# Patient Record
Sex: MALE | NOT HISPANIC OR LATINO | Employment: UNEMPLOYED | URBAN - NONMETROPOLITAN AREA
[De-identification: names, ages, dates, MRNs, and addresses within clinical notes are randomized per-mention and may not be internally consistent; named-entity substitution may affect disease eponyms.]

---

## 2018-03-12 ENCOUNTER — OFFICE VISIT CONVERTED (OUTPATIENT)
Dept: FAMILY MEDICINE CLINIC | Age: 23
End: 2018-03-12
Attending: NURSE PRACTITIONER

## 2018-06-22 ENCOUNTER — OFFICE VISIT CONVERTED (OUTPATIENT)
Dept: FAMILY MEDICINE CLINIC | Age: 23
End: 2018-06-22
Attending: NURSE PRACTITIONER

## 2018-07-05 ENCOUNTER — OFFICE VISIT CONVERTED (OUTPATIENT)
Dept: FAMILY MEDICINE CLINIC | Age: 23
End: 2018-07-05
Attending: NURSE PRACTITIONER

## 2018-07-20 ENCOUNTER — OFFICE VISIT CONVERTED (OUTPATIENT)
Dept: FAMILY MEDICINE CLINIC | Age: 23
End: 2018-07-20
Attending: NURSE PRACTITIONER

## 2019-02-28 ENCOUNTER — OFFICE VISIT CONVERTED (OUTPATIENT)
Dept: FAMILY MEDICINE CLINIC | Age: 24
End: 2019-02-28
Attending: NURSE PRACTITIONER

## 2019-06-18 ENCOUNTER — OFFICE VISIT CONVERTED (OUTPATIENT)
Dept: FAMILY MEDICINE CLINIC | Age: 24
End: 2019-06-18
Attending: NURSE PRACTITIONER

## 2019-09-25 ENCOUNTER — OFFICE VISIT CONVERTED (OUTPATIENT)
Dept: FAMILY MEDICINE CLINIC | Age: 24
End: 2019-09-25
Attending: NURSE PRACTITIONER

## 2020-03-25 ENCOUNTER — OFFICE VISIT CONVERTED (OUTPATIENT)
Dept: FAMILY MEDICINE CLINIC | Age: 25
End: 2020-03-25
Attending: NURSE PRACTITIONER

## 2021-05-18 NOTE — PROGRESS NOTES
Jaxson BobBarbie 1995     Office/Outpatient Visit    Visit Date: Thu, Feb 28, 2019 10:14 am    Provider: Enedelia Yadav N.P. (Assistant: Germaine Caro MA)    Location: [facility]        Electronically signed by Enedelia Yadav N.P. on  02/28/2019 08:01:11 PM                             SUBJECTIVE:        CC:     Mr. Bob is a 23 year old White male.  This is a follow-up visit.  med refills         HPI:         Patient presents with depression.  This is a routine follow-up.  The diagnosis of depression was made 15 months ago.  Current medications include Zoloft.  Currently, he is doing well without any significant affective symptoms.  Presently, Mr. Bob denies suicidal ideation.  He has no known pertinent medical conditions.      ROS:     CONSTITUTIONAL:  Negative for chills, fatigue and fever.      CARDIOVASCULAR:  Negative for chest pain, orthopnea, paroxysmal nocturnal dyspnea and pedal edema.      RESPIRATORY:  Negative for dyspnea and cough.      GASTROINTESTINAL:  Negative for abdominal pain, heartburn, constipation, diarrhea, and stool changes.      PSYCHIATRIC:  Positive for anxiety and depression ( (improved with meds) ).   Negative for suicidal thoughts.          PM/FM/SH:     Last Reviewed on 2/28/2019 10:25 AM by Enedelia Yadav    Past Medical History:         fraternal twin         Surgical History:         Cholecystectomy: oct 2017;     Tonsillectomy/Adenoidectomy         Family History:     Father: Seizure Disorder;  Bipolar Disorder     Mother: Systemic Lupus Erythematosus     Maternal Grandfather:;  Type 2 Diabetes     Maternal Grandmother: Breast Cancer         Social History:         Household:  Lives with his mother and sibling(s) ( 1 brother ).  Occupation:      Marital Status: Single     Children: None         Tobacco/Alcohol/Supplements:     Last Reviewed on 2/28/2019 10:25 AM by Enedelia Yadav    Tobacco: He has never smoked.           Alcohol: Frequency:    rarely;         Mental Health History:     Last Reviewed on 2/28/2019 10:25 AM by Enedelia Yadav            Current Problems:     Last Reviewed on 2/28/2019 10:25 AM by Enedelia Yadav    Tachycardia, NOS     Depression         Immunizations:     DTaP 4/5/2000     DTaP-Hib 4/11/1995     DTaP-Hib 6/6/1995     DTaP-Hib 8/1/1995     DTaP-Hib 4/30/1996     Td adult 7/1/2005     Hep B (pedi/adol, 3-dose schedule) 1994     Hep B (pedi/adol, 3-dose schedule) 4/11/1995     Hep B (pedi/adol, 3-dose schedule) 1/23/1996     OPV  Poliovirus, live (oral) 4/11/1995     OPV  Poliovirus, live (oral) 6/6/1995     OPV  Poliovirus, live (oral) 8/1/1995     IPV  Poliovirus, inactivated 4/5/2000     MMR  (Measles-Mumps-Rubella), live 4/30/1996     MMR  (Measles-Mumps-Rubella), live 4/5/2000     PPD 12/7/2017         Allergies:     Last Reviewed on 2/28/2019 10:25 AM by Enedelia Yadav      No Known Drug Allergies.     Lexapro: fatigue (Adverse Reaction)        Current Medications:     Last Reviewed on 2/28/2019 10:25 AM by Enedelia Yadav    Zoloft 50mg Tablet one a day         OBJECTIVE:        Vitals:         Current: 2/28/2019 10:18:31 AM    Ht:  5 ft, 9 in;  Wt: 265.8 lbs;  BMI: 39.3    T: 98.1 F (oral);  BP: 139/89 mm Hg (left arm, sitting);  P: 94 bpm (left arm (BP Cuff), sitting);  sCr: 0.93 mg/dL;  GFR: 137.35        Exams:     PHYSICAL EXAM:     GENERAL: Vitals recorded well developed, well nourished;  well groomed;  no apparent distress;     EYES: lids and lacrimal system are normal in appearance; extraocular movements intact; conjunctiva and cornea are normal; PERRLA;     E/N/T:  normal EACs, TMs, nasal/oral mucosa, teeth, gingiva, and oropharynx;     NECK:  supple, full ROM; no thyromegaly; no carotid bruits;     RESPIRATORY: normal respiratory rate and pattern with no distress; normal breath sounds with no rales, rhonchi, wheezes or rubs;     CARDIOVASCULAR: normal rate; rhythm is  regular;  normal S1; normal S2; no systolic murmur; no cyanosis; no edema;     GASTROINTESTINAL: nontender, nondistended; no hepatosplenomegaly or masses; no bruits;     SKIN:  no significant rashes or lesions; no suspicious moles;     MUSCULOSKELETAL:  Normal range of motion, strength and tone;     NEUROLOGICAL:  cranial nerves, motor and sensory function, reflexes, gait and coordination are all intact;     PSYCHIATRIC:  appropriate affect and demeanor; normal speech pattern; grossly normal memory;         ASSESSMENT:           311   F32.8   F34.1  Depression              DDx:         ORDERS:         Meds Prescribed:       Refill of: Zoloft (Sertraline HCl) 50mg Tablet one a day  #90 (Ninety) tablet(s) Refills: 3                 PLAN:          Depression         FOLLOW-UP: Schedule follow-up appointments on a p.r.n. basis..            Prescriptions:       Refill of: Zoloft (Sertraline HCl) 50mg Tablet one a day  #90 (Ninety) tablet(s) Refills: 3             Patient Recommendations:        For  Depression:     Schedule follow-up appointments as needed.                APPOINTMENT INFORMATION:        Monday Tuesday Wednesday Thursday Friday Saturday Sunday            Time:___________________AM  PM   Date:_____________________             CHARGE CAPTURE:           Primary Diagnosis:     311 Depression            F32.8    Other depressive episodesPlease remove code. F32.8, will not let me remove from chart. dmt            F34.1    Dysthymic disorder              Orders:          14593   Office/outpatient visit; established patient, level 3  (In-House)

## 2021-05-18 NOTE — PROGRESS NOTES
Jaxson Bob 1995     Office/Outpatient Visit    Visit Date: Tue, Jun 18, 2019 12:50 pm    Provider: Enedelia Yadav N.P. (Assistant: Tyesha Contreras LPN)    Location: [facility]        Electronically signed by Enedelia Yadav N.P. on  06/18/2019 01:16:32 PM                             SUBJECTIVE:        CC:     Mr. Bob is a 23 year old White male.  Sore in throat x 7 days         HPI:     Sore in the roof of his mouth x 7 days , hurts when he swallows or puts anything on it. Denies actual sore throat or fever     ROS:     CONSTITUTIONAL:  Negative for chills, fatigue and fever.      E/N/T:  Positive for oral sores/ulcers.      CARDIOVASCULAR:  Negative for chest pain, orthopnea, paroxysmal nocturnal dyspnea and pedal edema.      RESPIRATORY:  Negative for dyspnea and cough.      GASTROINTESTINAL:  Negative for abdominal pain, heartburn, constipation, diarrhea, and stool changes.      PSYCHIATRIC:  Negative for anxiety and depression.          PMH/FM/:     Last Reviewed on 6/18/2019 01:06 PM by Enedelia Yadav    Past Medical History:         fraternal twin         Surgical History:         Cholecystectomy: oct 2017;     Tonsillectomy/Adenoidectomy         Family History:     Father: Seizure Disorder;  Bipolar Disorder     Mother: Systemic Lupus Erythematosus     Maternal Grandfather:;  Type 2 Diabetes     Maternal Grandmother: Breast Cancer         Social History:         Household:  Lives with his mother and sibling(s) ( 1 brother ).  Occupation:      Marital Status: Single     Children: None         Tobacco/Alcohol/Supplements:     Last Reviewed on 6/18/2019 01:06 PM by Enedelia Yadav    Tobacco: He has never smoked.          Alcohol: Frequency:    rarely;             Current Problems:     Last Reviewed on 6/18/2019 01:06 PM by Enedelia Yadav    Tachycardia, NOS     Depression     Sore throat         Immunizations:     DTaP 4/5/2000     DTaP-Hib 4/11/1995      DTaP-Hib 6/6/1995     DTaP-Hib 8/1/1995     DTaP-Hib 4/30/1996     Td adult 7/1/2005     Hep B (pedi/adol, 3-dose schedule) 1994     Hep B (pedi/adol, 3-dose schedule) 4/11/1995     Hep B (pedi/adol, 3-dose schedule) 1/23/1996     OPV  Poliovirus, live (oral) 4/11/1995     OPV  Poliovirus, live (oral) 6/6/1995     OPV  Poliovirus, live (oral) 8/1/1995     IPV  Poliovirus, inactivated 4/5/2000     MMR  (Measles-Mumps-Rubella), live 4/30/1996     MMR  (Measles-Mumps-Rubella), live 4/5/2000     PPD 12/7/2017         Allergies:     Last Reviewed on 6/18/2019 01:06 PM by Enedelia Yadav      No Known Drug Allergies.     Lexapro: fatigue (Adverse Reaction)        Current Medications:     Last Reviewed on 6/18/2019 01:06 PM by Enedelia Yadav    Zoloft 50mg Tablet one a day         OBJECTIVE:        Vitals:         Current: 6/18/2019 12:54:28 PM    Ht:  5 ft, 9 in;  Wt: 262 lbs;  BMI: 38.7    T: 98.4 F (oral);  BP: 127/83 mm Hg (left arm, sitting);  P: 112 bpm (left arm (BP Cuff), sitting);  sCr: 0.93 mg/dL;  GFR: 136.51        Exams:     PHYSICAL EXAM:     GENERAL: Vitals recorded well developed, well nourished;  well groomed;  no apparent distress;     E/N/T: OROPHARYNX: palate exam reveals ulcer;     RESPIRATORY: normal respiratory rate and pattern with no distress; normal breath sounds with no rales, rhonchi, wheezes or rubs;     CARDIOVASCULAR: normal rate; rhythm is regular;  normal S1; normal S2; no systolic murmur; no cyanosis; no edema;     GASTROINTESTINAL: nontender, nondistended; no hepatosplenomegaly or masses; no bruits;     NEUROLOGIC: GROSSLY INTACT     PSYCHIATRIC:  appropriate affect and demeanor; normal speech pattern; grossly normal memory;         ASSESSMENT:           074.0   K12.39  Aphthous ulcer              DDx:         ORDERS:         Meds Prescribed:       Peridex (Chlorhexidine Gluconate) 0.12% Oral Rinse apply to ulcer QID prn  #240 ([address] [place] [address] [employer]) ml Refills: 0        Magic Mouthwash (diphenhydramine/nystatin/dexameth 1:1:1) (Diphenhydramine/Nystatin/Dexamethasone pharmacy compound) 12.5mg/5ml/100,000U/1ml/0.5mg/5ml Oral Liquid 1/4 tsp po swish and spit qid  #240 (Two [place] [address]) ml Refills: 0                 PLAN:          Aphthous ulcer         RECOMMENDATIONS given include: Warm water gargles.      FOLLOW-UP: Schedule follow-up appointments on a p.r.n. basis..            Prescriptions:       Peridex (Chlorhexidine Gluconate) 0.12% Oral Rinse apply to ulcer QID prn  #240 ([address] [place] [address]) ml Refills: 0       Magic Mouthwash (diphenhydramine/nystatin/dexameth 1:1:1) (Diphenhydramine/Nystatin/Dexamethasone pharmacy compound) 12.5mg/5ml/100,000U/1ml/0.5mg/5ml Oral Liquid 1/4 tsp po swish and spit qid  #240 (Two [place] [address]) ml Refills: 0             Patient Recommendations:        For  Aphthous ulcer:     Schedule follow-up appointments as needed.                APPOINTMENT INFORMATION:        Monday Tuesday Wednesday Thursday Friday Saturday Sunday            Time:___________________AM  PM   Date:_____________________             CHARGE CAPTURE:           Primary Diagnosis:     074.0 Aphthous ulcer            K12.39    Other oral mucositis (ulcerative)              Orders:          68182   Office/outpatient visit; established patient, level 3  (In-House)

## 2021-05-18 NOTE — PROGRESS NOTES
Jaxson BobBarbie 1995     Office/Outpatient Visit    Visit Date: Thu, Jul 5, 2018 11:01 am    Provider: Enedelia Yadav N.P. (Assistant: Nayely Contreras MA)    Location: [facility]        Electronically signed by Enedelia Yadav N.P. on  07/05/2018 12:14:59 PM                             SUBJECTIVE:        CC:     Mr. Bob is a 22 year old White male.  Left Leg Ache         HPI:         Mr. Bob presents with leg pain.  Mr. Bob sustained an injury to the left thigh.  No precipitating event or injury is identified.  Pain started 1 to 2 weeks ago.  He denies associated,  numbness, swelling, tingling, warmth and weakness.  Pain radiates to: NO radiation.  He characterizes the pain as cramping.  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 says he was suppose to grow out of it but never did.      ROS:     CONSTITUTIONAL:  Negative for chills, fatigue, fever and weight change.      CARDIOVASCULAR:  Negative for chest pain, orthopnea, paroxysmal nocturnal dyspnea and pedal edema.      RESPIRATORY:  Negative for dyspnea and cough.      GASTROINTESTINAL:  Negative for abdominal pain, heartburn, constipation, diarrhea, and stool changes.      MUSCULOSKELETAL:  Positive for left hip /thigh area cramping type pain x 2 weeks, no injury that he can remember. Hurts worse at night sometimes when he is laying down. Denies any problems with urination, getting up at night to void  or buring with urination.          PM/FM/:     Last Reviewed on 7/05/2018 11:10 AM by Enedelia Yadav    Past Medical History:         fraternal twin         Surgical History:         Cholecystectomy: oct 2017;     Tonsillectomy/Adenoidectomy         Family History:     Father: Seizure Disorder;  Bipolar Disorder     Mother: Systemic Lupus Erythematosus     Maternal Grandfather:;  Type 2 Diabetes     Maternal Grandmother: Breast Cancer         Social History:          Household:  Lives with his mother and sibling(s) ( 1 brother ).      Currently attends College. 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          Alcohol: Frequency:    rarely;             Current Problems:     Last Reviewed on 7/05/2018 11:10 AM by Enedelia Yadav    Fever of unknown origin (FUO)     Tachycardia, NOS     Depression     Leg pain     Blood pressure elevation without a diagnosis of HTN     Otalgia, otogenic origin         Immunizations:     DTaP 4/5/2000     DTaP-Hib 4/11/1995     DTaP-Hib 6/6/1995     DTaP-Hib 8/1/1995     DTaP-Hib 4/30/1996     Td adult 7/1/2005     Hep B (pedi/adol, 3-dose schedule) 1994     Hep B (pedi/adol, 3-dose schedule) 4/11/1995     Hep B (pedi/adol, 3-dose schedule) 1/23/1996     OPV  Poliovirus, live (oral) 4/11/1995     OPV  Poliovirus, live (oral) 6/6/1995     OPV  Poliovirus, live (oral) 8/1/1995     IPV  Poliovirus, inactivated 4/5/2000     MMR  (Measles-Mumps-Rubella), live 4/30/1996     MMR  (Measles-Mumps-Rubella), live 4/5/2000     PPD 12/7/2017         Allergies:     Last Reviewed on 7/05/2018 11:10 AM by Enedelia Yadav      No Known Drug Allergies.     Lexapro: fatigue (Adverse Reaction)        Current Medications:     Last Reviewed on 7/05/2018 11:10 AM by Enedelia Yadav    Zoloft 50mg Tablet 1/2 a day for first week then one a day         OBJECTIVE:        Vitals:         Current: 7/5/2018 11:03:06 AM    Ht:  5 ft, 9 in;  Wt: 249 lbs;  BMI: 36.8    T: 98.5 F (oral);  BP: 147/82 mm Hg (left arm, sitting);  P: 83 bpm (left arm (BP Cuff), sitting);  sCr: 0.93 mg/dL;  GFR: 134.72        Exams:     PHYSICAL EXAM:     GENERAL: Vitals recorded well developed, well nourished;  well groomed;  no apparent distress;     RESPIRATORY: normal  respiratory rate and pattern with no distress; normal breath sounds with no rales, rhonchi, wheezes or rubs;     CARDIOVASCULAR: normal rate; rhythm is regular;  normal S1; normal S2; no systolic murmur; no cyanosis; no edema;     GASTROINTESTINAL: nontender, nondistended; no hepatosplenomegaly or masses; no bruits;     MUSCULOSKELETAL: gait: affected by a left leg limp;  Mild positive paramjit left hip, right hip negative;     PSYCHIATRIC:  appropriate affect and demeanor; normal speech pattern; grossly normal memory;         ASSESSMENT:           729.5   M79.606  Leg pain              DDx:     719.45   M25.559  Hip pain              DDx:         ORDERS:         Meds Prescribed:       Refill of: Zoloft (Sertraline HCl) 50mg Tablet one a day  #30 (Thirty) tablet(s) Refills: 2       Amitriptyline HCl 25mg Tablet 1 po QHS  #30 (Thirty) tablet(s) Refills: 0         Radiology/Test Orders:       [id number]  Bilateral hip x-ray, minimum of two views of each hip, including anteroposterior view of pelvis  (Send-Out; Stat)           Lab Orders:       APPTO  Appointment need  (In-House)                   PLAN:          Leg pain         FOLLOW-UP: Schedule a follow-up visit in 1 month..            Prescriptions:       Amitriptyline HCl 25mg Tablet 1 po QHS  #30 (Thirty) tablet(s) Refills: 0           Orders:       APPTO  Appointment need  (In-House)            Hip pain If xrays are normal and pain is not improving with Amitriptyline then will need to consider additional imaging. dmt         RADIOLOGY:  I have ordered a bilateral hip x-ray to be done today.            Orders:       [id number]  Bilateral hip x-ray, minimum of two views of each hip, including anteroposterior view of pelvis  (Send-Out; Stat)               Other Prescriptions:       Refill of: Zoloft (Sertraline HCl) 50mg Tablet one a day  #30 (Thirty) tablet(s) Refills: 2         Patient Recommendations:        For  Leg pain:     Schedule a follow-up visit in 1 month.                 APPOINTMENT INFORMATION:        Monday Tuesday Wednesday Thursday Friday Saturday Sunday            Time:___________________AM  PM   Date:_____________________             CHARGE CAPTURE:           Primary Diagnosis:     729.5 Leg pain            M79.606    Pain in leg, unspecified              Orders:          [id number]   Office/outpatient visit; established patient, level 4  (In-House)             APPTO   Appointment need  (In-House)           719.45 Hip pain            M25.559    Pain in unspecified hip        ADDENDUMS:      ____________________________________    Date: 07/12/2018 11:20 AM    Author: Valerie Rubio         Visit Note Faxed to:        Kaz Barnes  (Surgery, Orthopedic); Number [phone]

## 2021-05-18 NOTE — PROGRESS NOTES
Jaxson BobBarbie 1995     Office/Outpatient Visit    Visit Date: Fri, Jul 20, 2018 02:42 pm    Provider: Enedelia Yadav N.P. (Assistant: Nayely Contreras MA)    Location: [facility]        Electronically signed by Enedelia Yadav N.P. on  07/20/2018 04:33:50 PM                             SUBJECTIVE:        CC:     Mr. Bbo is a 22 year old White male.  Leg Pain         HPI:     Mr. Bob presents with leg pain.  Mr. Bob sustained no injury to the left thigh.  No precipitating event or injury is identified.  Pain started 6 weeks ago.  He denies associated,  numbness, swelling, tingling, warmth and weakness.  Pain does not radiate.  He characterizes the pain as cramping.  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 says he was suppose to grow out of it but never did.  Took Elavil over past 4 weeks without improvement. Has apt with Dr. Barnes but it is one month away         ROS:     CONSTITUTIONAL:  Negative for chills, fatigue, fever and weight change.      CARDIOVASCULAR:  Negative for chest pain, orthopnea, paroxysmal nocturnal dyspnea and pedal edema.      RESPIRATORY:  Negative for dyspnea and cough.      GASTROINTESTINAL:  Negative for abdominal pain, heartburn, constipation, diarrhea, and stool changes.      MUSCULOSKELETAL:  Positive for Left leg pain.          PMH/FMH/SH:     Last Reviewed on 7/20/2018 04:30 PM by Enedelia Yadav    Past Medical History:         fraternal twin         Surgical History:         Cholecystectomy: oct 2017;     Tonsillectomy/Adenoidectomy         Family History:     Father: Seizure Disorder;  Bipolar Disorder     Mother: Systemic Lupus Erythematosus     Maternal Grandfather:;  Type 2 Diabetes     Maternal Grandmother: Breast Cancer         Social History:         Household:  Lives with his mother and sibling(s) ( 1 brother ).      Currently attends College. ( [employer];  [employer]; field of  study is music education ) Occupation: Student     Marital Status: Single     Children: None         Tobacco/Alcohol/Supplements:     Last Reviewed on 7/20/2018 04:30 PM by Enedelia Yadav    Tobacco: He has never smoked.          Alcohol: Frequency:    rarely;             Current Problems:     Last Reviewed on 7/20/2018 04:30 PM by Enedelia Yadav    Hip pain     Fever of unknown origin (FUO)     Tachycardia, NOS     Depression     Leg pain     Blood pressure elevation without a diagnosis of HTN     Otalgia, otogenic origin         Immunizations:     DTaP 4/5/2000     DTaP-Hib 4/11/1995     DTaP-Hib 6/6/1995     DTaP-Hib 8/1/1995     DTaP-Hib 4/30/1996     Td adult 7/1/2005     Hep B (pedi/adol, 3-dose schedule) 1994     Hep B (pedi/adol, 3-dose schedule) 4/11/1995     Hep B (pedi/adol, 3-dose schedule) 1/23/1996     OPV  Poliovirus, live (oral) 4/11/1995     OPV  Poliovirus, live (oral) 6/6/1995     OPV  Poliovirus, live (oral) 8/1/1995     IPV  Poliovirus, inactivated 4/5/2000     MMR  (Measles-Mumps-Rubella), live 4/30/1996     MMR  (Measles-Mumps-Rubella), live 4/5/2000     PPD 12/7/2017         Allergies:     Last Reviewed on 7/20/2018 04:30 PM by Enedelia Yadav      No Known Drug Allergies.     Lexapro: fatigue (Adverse Reaction)        Current Medications:     Last Reviewed on 7/20/2018 04:30 PM by Enedelia Yadav    Zoloft 50mg Tablet one a day         OBJECTIVE:        Vitals:         Current: 7/20/2018 2:46:29 PM    Ht:  5 ft, 9 in;  Wt: 247.9 lbs;  BMI: 36.6    T: 99.5 F (oral);  BP: 126/86 mm Hg (left arm, sitting);  P: 90 bpm (left arm (BP Cuff), sitting);  sCr: 0.93 mg/dL;  GFR: 134.47        Exams:     PHYSICAL EXAM:     GENERAL: Vitals recorded well developed, well nourished;  well groomed;  no apparent distress;     RESPIRATORY: normal respiratory rate and pattern with no distress; normal breath sounds with no rales, rhonchi, wheezes or rubs;     CARDIOVASCULAR: normal rate;  rhythm is regular;  normal S1; normal S2; no systolic murmur; no cyanosis; no edema;     GASTROINTESTINAL: nontender; normal bowel sounds; no organomegaly; no abdominal hernias;     MUSCULOSKELETAL: gait: affected by a left leg limp;  Mild positive paramjit left hip, right hip negative; DTR radha wnl;         ASSESSMENT:           729.5   M79.606  Leg pain              DDx:         ORDERS:         Meds Prescribed:       Meloxicam 15mg Tablet 1 po QD with food  #30 (Thirty) tablet(s) Refills: 1       Gabapentin 300mg Capsules 1 capsule HS  #30 (Thirty) capsule(s) Refills: 0                 PLAN:          Leg pain We will check up with Dr. Barnes and see if we can get appt moved up dmt         FOLLOW-UP: Schedule follow-up appointments on a p.r.n. basis..   for f/u in 2-3 weeks           Prescriptions:       Meloxicam 15mg Tablet 1 po QD with food  #30 (Thirty) tablet(s) Refills: 1       Gabapentin 300mg Capsules 1 capsule HS  #30 (Thirty) capsule(s) Refills: 0             Patient Recommendations:        For  Leg pain:     Schedule follow-up appointments as needed.                APPOINTMENT INFORMATION:        Monday Tuesday Wednesday Thursday Friday Saturday Sunday            Time:___________________AM  PM   Date:_____________________             CHARGE CAPTURE:           Primary Diagnosis:     729.5 Leg pain            M79.606    Pain in leg, unspecified              Orders:          [id number]   Office/outpatient visit; established patient, level 3  (In-House)               ADDENDUMS:      ____________________________________    Date: 08/06/2018 02:02 PM    Author: Danuta Shea         Visit Note Faxed to:        User Entered Recipient; Number [phone]

## 2021-05-18 NOTE — PROGRESS NOTES
Jaxson Bob 1995     Office/Outpatient Visit    Visit Date: Fri, Jun 22, 2018 01:06 pm    Provider: Rola Lezama N.P. (Assistant: Rona Pollard MA)    Location: [facility]        Electronically signed by Rola Lezama N.P. on  06/22/2018 08:38:56 PM                             SUBJECTIVE:        CC:     Mr. Bob is a 22 year old White male.  Patient complains of ear ache and headaches.          HPI: seen with maritza orr student     Ear fullness started 2 or 3 days ago. Now has headache and fever with sinus pressure. Taken nasal spray today but nothing else.         noted for first time at office visit today.  denies body aches, sore throat, tick bites, rashes, abd pain, vomiting , diarrhea or any other signs of illness.      ROS:     CONSTITUTIONAL:  Negative for chills and fever.      EYES:  Negative for blurred vision.      E/N/T:  Negative for nasal congestion, frequent rhinorrhea and sore throat.      CARDIOVASCULAR:  Negative for chest pain and palpitations.  acid reflux and takes omeperazole prn     RESPIRATORY:  Negative for recent cough, dyspnea and frequent wheezing.      GASTROINTESTINAL:  Negative for abdominal pain, diarrhea, nausea and vomiting.      GENITOURINARY:  Negative for dysuria.      MUSCULOSKELETAL:  Negative for arthralgias and myalgias.      INTEGUMENTARY:  Negative for rash.      NEUROLOGICAL:  Positive for headaches.   Negative for dizziness, paresthesias or weakness.      PSYCHIATRIC:  Positive for anxiety and depression.  Taking zoloft and doing well.         PMH/FM/SH:     Last Reviewed on 3/12/2018 07:00 PM by Enedelia Yadav    Past Medical History:         fraternal twin         Surgical History:         Cholecystectomy: oct 2017;     Tonsillectomy/Adenoidectomy         Family History:     Father: Seizure Disorder;  Bipolar Disorder     Mother: Systemic Lupus Erythematosus     Maternal Grandfather:;  Type 2 Diabetes     Maternal  Grandmother: Breast Cancer         Social History:         Household:  Lives with his mother and sibling(s) ( 1 brother ).      Currently attends College. 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          Alcohol: Frequency:    rarely;         Substance Abuse History:     Last Reviewed on 3/12/2018 07:00 PM by Enedelia Yadav    None         Mental Health History:     Last Reviewed on 3/12/2018 07:00 PM by Enedelia Yadav        Communicable Diseases (eg STDs):     Last Reviewed on 3/12/2018 07:00 PM by Enedelia Yadav            Current Problems:     Last Reviewed on 3/12/2018 07:00 PM by Enedelia Yadav    Tachycardia, NOS     Depression         Immunizations:     DTaP 4/5/2000     DTaP-Hib 4/11/1995     DTaP-Hib 6/6/1995     DTaP-Hib 8/1/1995     DTaP-Hib 4/30/1996     Td adult 7/1/2005     Hep B (pedi/adol, 3-dose schedule) 1994     Hep B (pedi/adol, 3-dose schedule) 4/11/1995     Hep B (pedi/adol, 3-dose schedule) 1/23/1996     OPV  Poliovirus, live (oral) 4/11/1995     OPV  Poliovirus, live (oral) 6/6/1995     OPV  Poliovirus, live (oral) 8/1/1995     IPV  Poliovirus, inactivated 4/5/2000     MMR  (Measles-Mumps-Rubella), live 4/30/1996     MMR  (Measles-Mumps-Rubella), live 4/5/2000     PPD 12/7/2017         Allergies:     Last Reviewed on 6/22/2018 01:11 PM by Rona Pollard      No Known Drug Allergies.     Lexapro: fatigue (Adverse Reaction)        Current Medications:     Last Reviewed on 6/22/2018 01:11 PM by Rona Pollard    Zoloft 50mg Tablet 1/2 a day for first week then one a day         OBJECTIVE:        Vitals:         Historical:     03/12/2018  BP:   139/79 mm Hg ( (left arm, , sitting, );)     12/13/2017  BP:   137/78 mm Hg ( (left arm, , sitting, );)      03/12/2018  Wt:   244.4lbs        Current: 6/22/2018 1:10:00 PM    Ht:  5 ft, 9 in;  Wt: 250 lbs;  BMI: 36.9    T: 101.4 F (oral);  BP: 152/81 mm Hg (left arm, sitting);  P: 127 bpm (left arm (BP Cuff), sitting);  sCr: 0.93 mg/dL;  GFR: 134.95        Repeat:     1:13:38 PM     BP:   150/70mm Hg (left arm, sitting)     1:12:22 PM     P:   131bpm (finger clip, sitting)         Exams:     PHYSICAL EXAM:     GENERAL: Vitals recorded well developed, well nourished;  well groomed;  no apparent distress;     EYES: conjunctiva and cornea are normal;     E/N/T: EARS:  normal external auditory canals and tympanic membranes;  grossly normal hearing; NOSE:  normal nasal mucosa, septum, turbinates, and sinuses; OROPHARYNX:  normal mucosa, dentition, gingiva, and posterior pharynx;     NECK: thyroid is non-palpable;     RESPIRATORY: normal respiratory rate and pattern with no distress; normal breath sounds with no rales, rhonchi, wheezes or rubs;     CARDIOVASCULAR: normal rate; rhythm is regular;  no edema;     LYMPHATIC: no enlargement of cervical or facial nodes; no supraclavicular nodes;     SKIN:  No rash;     MUSCULOSKELETAL:  Normal range of motion, strength and tone;     NEUROLOGIC: mental status: oriented to person, place, and time;  GROSSLY INTACT     PSYCHIATRIC: appropriate affect and demeanor; normal speech pattern;         ASSESSMENT           388.71   H92.03  Otalgia, otogenic origin              DDx:     780.60   R50.9  Fever of unknown origin (FUO)              DDx:     796.2   R03.0  Blood pressure elevation without a diagnosis of HTN              DDx:         ORDERS:         Lab Orders:       [id number]  [facility] CBC with 3 part diff  (Send-Out)         63225  [facility] Urinalysis, automated, with micro  (Send-Out)                   PLAN:          Otalgia, otogenic origin         reassure.  normal exam.  continue flonase.           Fever of unknown origin (FUO)     LABORATORY:  Labs ordered to be performed  today include CBC and urinalysis with micro.      RECOMMENDATIONS given include: rest , increase fluids, ibuprofen or aleve prn with food.  labs pending..            Orders:       [id number]  [facility] - [facility] CBC with 3 part diff  (Send-Out)         19596  [facility] - [facility] Urinalysis, automated, with micro  (Send-Out)            Blood pressure elevation without a diagnosis of HTN         RECOMMENDATIONS given include: free bp check in one week..              CHARGE CAPTURE           **Please note: ICD descriptions below are intended for billing purposes only and may not represent clinical diagnoses**        Primary Diagnosis:         388.71 Otalgia, otogenic origin            H92.03    Otalgia, bilateral              Orders:          [id number]   Office/outpatient visit; established patient, level 3  (In-House)           780.60 Fever of unknown origin (FUO)            R50.9    Fever, unspecified    796.2 Blood pressure elevation without a diagnosis of HTN            R03.0    Elevated blood-pressure reading, without diagnosis of hypertension

## 2021-05-18 NOTE — PROGRESS NOTES
Jaxson BobBarbie 1995     Office/Outpatient Visit    Visit Date: Mon, Mar 12, 2018 06:30 pm    Provider: Enedelia Yadav N.P. (Assistant: Germaine Caro MA)    Location: [facility]        Electronically signed by Enedelia Yadav N.P. on  03/12/2018 07:53:13 PM                             SUBJECTIVE:        CC:     Mr. Bob is a 22 year old White male.  He presents with right ear ache.          HPI:         Patient complains of ear pain.      Mr. Bob complains of right ear pain.  Associated symptoms include diminished hearing and dizziness.  He denies cough, ear drainage or fever.  The symptoms began 2 weeks ago.  Pertinent history includes allergies.  He has already tried to relieve the symptoms with antihistamines and decongestants.      ROS:     CONSTITUTIONAL:  Negative for chills, fatigue, fever and weight change.      E/N/T:  Positive for ear pain ( bilateral ).      CARDIOVASCULAR:  Negative for chest pain, orthopnea, paroxysmal nocturnal dyspnea and pedal edema.      RESPIRATORY:  Negative for dyspnea and cough.      GASTROINTESTINAL:  Negative for abdominal pain, heartburn, constipation, diarrhea, and stool changes.          [facility]/:     Last Reviewed on 3/12/2018 07:00 PM by Enedelia Yadav    Past Medical History:         fraternal twin         Surgical History:         Cholecystectomy: oct 2017;     Tonsillectomy/Adenoidectomy         Family History:     Father: Seizure Disorder;  Bipolar Disorder     Mother: Systemic Lupus Erythematosus     Maternal Grandfather:;  Type 2 Diabetes     Maternal Grandmother: Breast Cancer         Social History:         Household:  Lives with his mother and sibling(s) ( 1 brother ).      Currently attends College. 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          Alcohol: Frequency:    rarely;             Current Problems:     Last Reviewed on 3/12/2018 07:00 PM by Enedelia Yadav    Tachycardia, NOS     Depression     Ear pain         Immunizations:     DTaP 4/5/2000     DTaP-Hib 4/11/1995     DTaP-Hib 6/6/1995     DTaP-Hib 8/1/1995     DTaP-Hib 4/30/1996     Td adult 7/1/2005     Hep B (pedi/adol, 3-dose schedule) 1994     Hep B (pedi/adol, 3-dose schedule) 4/11/1995     Hep B (pedi/adol, 3-dose schedule) 1/23/1996     OPV  Poliovirus, live (oral) 4/11/1995     OPV  Poliovirus, live (oral) 6/6/1995     OPV  Poliovirus, live (oral) 8/1/1995     IPV  Poliovirus, inactivated 4/5/2000     MMR  (Measles-Mumps-Rubella), live 4/30/1996     MMR  (Measles-Mumps-Rubella), live 4/5/2000     PPD 12/7/2017         Allergies:     Last Reviewed on 3/12/2018 07:00 PM by Enedelia Yadav      No Known Drug Allergies.     Lexapro: fatigue (Adverse Reaction)        Current Medications:     Last Reviewed on 3/12/2018 07:00 PM by Enedelia Yadav    Zoloft 50mg Tablet 1/2 a day for first week then one a day     Sudafed 30mg Tablet 1 tab qd prn         OBJECTIVE:        Vitals:         Current: 3/12/2018 6:32:01 PM    Ht:  5 ft, 9 in;  Wt: 244.4 lbs;  BMI: 36.1    T: 97.5 F (oral);  BP: 139/79 mm Hg (left arm, sitting);  P: 96 bpm (left arm (BP Cuff), sitting);  sCr: 0.93 mg/dL;  GFR: 133.66        Exams:     PHYSICAL EXAM:     GENERAL: Vitals recorded well developed, well nourished;  well groomed;  no apparent distress;     E/N/T: EARS: both TMs are dull and red;     RESPIRATORY: normal respiratory rate and pattern with no distress; normal breath sounds with no rales, rhonchi, wheezes or rubs;     CARDIOVASCULAR: normal rate; rhythm is regular;  normal S1; normal S2; no systolic murmur; no cyanosis; no edema;     GASTROINTESTINAL: nontender, nondistended; no hepatosplenomegaly or masses; no bruits;         ASSESSMENT:           382.00    H66.003  Acute otitis media              DDx:         ORDERS:         Meds Prescribed:       Amoxicillin 875mg Tablet One PO BID X 10 days.  #20 (Twenty) tablet(s) Refills: 0                 PLAN:          Acute otitis media         FOLLOW-UP: Advised to call if there is no improvement Follow-up by phone if no improvement in 1 week..   Schedule follow-up appointments on a p.r.n. basis..            Prescriptions:       Amoxicillin 875mg Tablet One PO BID X 10 days.  #20 (Twenty) tablet(s) Refills: 0           Patient Education Handouts:       Acute Ear Infection (Otitis Media)              Patient Recommendations:        For  Acute otitis media:     Follow-up by phone if no improvement in 1 week. Schedule follow-up appointments as needed.                APPOINTMENT INFORMATION:        Monday Tuesday Wednesday Thursday Friday Saturday Sunday            Time:___________________AM  PM   Date:_____________________             CHARGE CAPTURE:           Primary Diagnosis:     382.00 Acute otitis media            H66.003    Acute suppurative otitis media without spontaneous rupture of ear drum, bilateral              Orders:          [id number]   Office/outpatient visit; established patient, level 3  (In-House)

## 2021-05-18 NOTE — PROGRESS NOTES
Jaxson Bob  1995     Office/Outpatient Visit    Visit Date: Wed, Mar 25, 2020 03:39 pm    Provider: Enedelia Yadav N.P. (Assistant: Michelle Mehta MA)    Location: [facility]        Electronically signed by Enedelia Yadav N.P. on  03/25/2020 04:09:58 PM                             Subjective:        CC: Mr. Bob is a 24 year old White male.  This is a follow-up visit.  check up, medication refills         HPI:       Telehealth visit for follow up on anxiety and depression. Taking medication daily with good results , mood stable no exacerbations or break throughs recently. Having some Reflux symptoms over last few weeks has taken OTC Omeprazole from his Mom and that did help. .    ROS:     CONSTITUTIONAL:  Negative for chills, fatigue, fever, and weight change.      CARDIOVASCULAR:  Negative for chest pain, palpitations, tachycardia, orthopnea, and edema.      RESPIRATORY:  Negative for recent cough, dyspnea and frequent wheezing.      GASTROINTESTINAL:  Positive for acid reflux symptoms ( acid taste in mouth; burning in throat and upper chest; indigestion and belching; took Moms Omeprazole with improvement ).   Negative for abdominal pain, constipation, diarrhea, nausea or vomiting.      PSYCHIATRIC:  Positive for anxiety and depression ( (improved with zoloft) ).          Past Medical History / Family History / Social History:         Last Reviewed on 3/25/2020 04:05 PM by Enedelia Yadav    Past Medical History:         fraternal twin         Surgical History:         Cholecystectomy: oct 2017;     Tonsillectomy/Adenoidectomy         Family History:     Father: Seizure Disorder;  Bipolar Disorder     Mother: Systemic Lupus Erythematosus     Maternal Grandfather:;  Type 2 Diabetes     Maternal Grandmother: Breast Cancer         Social History:         Household:  Lives with his mother and sibling(s) ( 1 brother ).  Occupation:      Marital Status: Single      Children: None         Tobacco/Alcohol/Supplements:     Last Reviewed on 3/25/2020 04:05 PM by Enedelia Ydaav    Tobacco: He has never smoked.          Alcohol: Frequency:    rarely;         Substance Abuse History:     Last Reviewed on 3/25/2020 04:05 PM by Enedelia Yadav    None         Mental Health History:     Last Reviewed on 3/25/2020 04:05 PM by Enedelia Yadav        Communicable Diseases (eg STDs):     Last Reviewed on 3/25/2020 04:05 PM by Enedelia Yadav        Current Problems:     Last Reviewed on 3/25/2020 04:05 PM by Enedelia Yadav    Other depressive episodes    Dysthymic disorder    Tachycardia, unspecified        Immunizations:     DTaP 4/5/2000    DTaP-Hib 4/11/1995    DTaP-Hib 6/6/1995    DTaP-Hib 8/1/1995    DTaP-Hib 4/30/1996    Td adult 7/1/2005    Hep B (pedi/adol, 3-dose schedule) 1994    Hep B (pedi/adol, 3-dose schedule) 4/11/1995    Hep B (pedi/adol, 3-dose schedule) 1/23/1996    OPV  Poliovirus, live (oral) 4/11/1995    OPV  Poliovirus, live (oral) 6/6/1995    OPV  Poliovirus, live (oral) 8/1/1995    IPV  Poliovirus, inactivated 4/5/2000    MMR  (Measles-Mumps-Rubella), live 4/30/1996    MMR  (Measles-Mumps-Rubella), live 4/5/2000    PPD 12/7/2017        Allergies:     Last Reviewed on 3/25/2020 04:05 PM by Enedelia Yadav    Lexapro: fatigue  (Adverse Reaction)        Current Medications:     Last Reviewed on 3/25/2020 04:05 PM by Enedelia Yadav    Zoloft 50mg Tablet [one a day]        Objective:        Exams: Answering questions appropriately         Assessment:         F34.1   Dysthymic disorder       K21.9   Gastro-esophageal reflux disease without esophagitis           ORDERS:         Meds Prescribed:       [Refilled] Zoloft 50 mg oral tablet [one a day], #90 (ninety) tablets, Refills: 3 (three)       [New Rx] omeprazole 40 mg oral capsule,delayed release (enteric coated) [1 capsule daily], #90 (ninety) capsules, Refills: 0 (zero)                  Plan:         Dysthymic disorder    Telehealth: Verbal consent obtained for visit to occur via phone call; Staff, other than provider, present during telephone visit include Enedelia LENNON , Jaxson Bob patient; Total time spent was 10 minutes; 29700--[id number] E/M 5-10 minutes           Prescriptions:       [Refilled] Zoloft 50 mg oral tablet [one a day], #90 (ninety) tablets, Refills: 3 (three)         Gastro-esophageal reflux disease without esophagitis          Prescriptions:       [New Rx] omeprazole 40 mg oral capsule,delayed release (enteric coated) [1 capsule daily], #90 (ninety) capsules, Refills: 0 (zero)             Charge Capture:         Primary Diagnosis:     F34.1  Dysthymic disorder           Orders:      [id number]  Phys/QHP telephone evaluation 5-10 min  (In-House)              K21.9  Gastro-esophageal reflux disease without esophagitis

## 2021-05-18 NOTE — PROGRESS NOTES
"Jaxson Bob 1995     Office/Outpatient Visit    Visit Date: Wed, Sep 25, 2019 11:37 am    Provider: Rola Lezama N.P. (Assistant: Rosario Montero MA)    Location: [facility]        Electronically signed by Rola Lezama N.P. on  09/25/2019 09:33:15 PM                             SUBJECTIVE:        CC:     Mr. Bob is a 23 year old White male.  presents today due to complaints of cough, congestion, drainage X 4-5 days         HPI:         Patient complains of uRI.  These have been present for the past 6 days.  The symptoms include cough, nasal congestion and purulent nasal discharge.  He denies fever or wheezing.  He denies exposure to ill contacts.  He has already tried to relieve the symptoms with mixed cold/sinus preparations.      ROS:     CONSTITUTIONAL:  Positive for fatigue.   Negative for fever.      E/N/T:  Positive for nasal congestion and frequent rhinorrhea.   Negative for sore throat.      CARDIOVASCULAR:  Negative for chest pain, palpitations, tachycardia, orthopnea, and edema.      RESPIRATORY:  Positive for recent cough.      MUSCULOSKELETAL:  Negative for arthralgias, back pain, and myalgias.      NEUROLOGICAL:  Positive for headaches ( \"sinus\" ).   Negative for dizziness.          PMH/FMH/SH:     Last Reviewed on 6/18/2019 01:06 PM by Enedelia Yadav    Past Medical History:         fraternal twin         Surgical History:         Cholecystectomy: oct 2017;     Tonsillectomy/Adenoidectomy         Family History:     Father: Seizure Disorder;  Bipolar Disorder     Mother: Systemic Lupus Erythematosus     Maternal Grandfather:;  Type 2 Diabetes     Maternal Grandmother: Breast Cancer         Social History:         Household:  Lives with his mother and sibling(s) ( 1 brother ).  Occupation:      Marital Status: Single     Children: None         Tobacco/Alcohol/Supplements:     Last Reviewed on 6/18/2019 01:06 PM by Enedelia Yadav    Tobacco: He has never " smoked.          Alcohol: Frequency:    rarely;         Substance Abuse History:     Last Reviewed on 6/18/2019 01:06 PM by Enedelia Yadav    None         Mental Health History:     Last Reviewed on 6/18/2019 01:06 PM by Enedelia Yadav        Communicable Diseases (eg STDs):     Last Reviewed on 6/18/2019 01:06 PM by Enedelia Yadav            Current Problems:     Last Reviewed on 6/18/2019 01:06 PM by Enedelia Yadav    Tachycardia, NOS     Depression         Immunizations:     DTaP 4/5/2000     DTaP-Hib 4/11/1995     DTaP-Hib 6/6/1995     DTaP-Hib 8/1/1995     DTaP-Hib 4/30/1996     Td adult 7/1/2005     Hep B (pedi/adol, 3-dose schedule) 1994     Hep B (pedi/adol, 3-dose schedule) 4/11/1995     Hep B (pedi/adol, 3-dose schedule) 1/23/1996     OPV  Poliovirus, live (oral) 4/11/1995     OPV  Poliovirus, live (oral) 6/6/1995     OPV  Poliovirus, live (oral) 8/1/1995     IPV  Poliovirus, inactivated 4/5/2000     MMR  (Measles-Mumps-Rubella), live 4/30/1996     MMR  (Measles-Mumps-Rubella), live 4/5/2000     PPD 12/7/2017         Allergies:     Last Reviewed on 6/18/2019 01:06 PM by Enedelia Yadav      No Known Drug Allergies.     Lexapro: fatigue (Adverse Reaction)        Current Medications:     Last Reviewed on 9/25/2019 11:39 AM by Rosario Montero    Zoloft 50mg Tablet one a day         OBJECTIVE:        Vitals:         Historical:     06/18/2019  BP:   127/83 mm Hg ( (left arm, , sitting, );)     06/18/2019  Wt:   262lbs        Current: 9/25/2019 11:40:29 AM    Ht:  5 ft, 9 in;  Wt: 256.6 lbs;  BMI: 37.9    T: 98.4 F (oral);  BP: 142/88 mm Hg (left arm, sitting);  P: 91 bpm (left arm (BP Cuff), sitting);  sCr: 0.93 mg/dL;  GFR: 135.31        Repeat:     12:12:12 PM     BP:   128/78mm Hg (left arm, sitting)         Exams:     PHYSICAL EXAM:     GENERAL:  well developed and nourished; appropriately groomed; in no apparent distress;     E/N/T: EARS: both TMs are have fluid behind them;   NOSE: nasal mucosa is erythematous;  bilateral maxillary sinus tenderness present; OROPHARYNX: posterior pharynx, including tonsils, tongue, and uvula are normal;     RESPIRATORY: normal respiratory rate and pattern with no distress; normal breath sounds with no rales, rhonchi, wheezes or rubs;     CARDIOVASCULAR: normal rate; rhythm is regular;     LYMPHATIC: no enlargement of cervical or facial nodes;     MUSCULOSKELETAL:  Normal range of motion, strength and tone;     NEUROLOGIC: mental status: alert and oriented x 3; GROSSLY INTACT     PSYCHIATRIC:  appropriate affect and demeanor; normal speech pattern; grossly normal memory;         ASSESSMENT           461.0   J01.00  Acute maxillary sinusitis              DDx:         ORDERS:         Meds Prescribed:       Amoxicillin 875mg Tablet 1 BID  #20 (Twenty) tablet(s) Refills: 0                 PLAN:          Acute maxillary sinusitis         RECOMMENDATIONS given include: rest, increase oral fluid intake, and follow up if not improving..            Prescriptions:       Amoxicillin 875mg Tablet 1 BID  #20 (Twenty) tablet(s) Refills: 0             Patient Recommendations:        For  Acute maxillary sinusitis:     Get plenty of rest. Increase oral fluid intake.              CHARGE CAPTURE           **Please note: ICD descriptions below are intended for billing purposes only and may not represent clinical diagnoses**        Primary Diagnosis:         461.0 Acute maxillary sinusitis            J01.00    Acute maxillary sinusitis, unspecified              Orders:          [id number]   Office/outpatient visit; established patient, level 3  (In-House)

## 2021-07-01 VITALS
BODY MASS INDEX: 37.03 KG/M2 | DIASTOLIC BLOOD PRESSURE: 70 MMHG | TEMPERATURE: 101.4 F | HEIGHT: 69 IN | SYSTOLIC BLOOD PRESSURE: 150 MMHG | WEIGHT: 250 LBS | HEART RATE: 131 BPM

## 2021-07-01 VITALS
BODY MASS INDEX: 38 KG/M2 | WEIGHT: 256.6 LBS | HEIGHT: 69 IN | DIASTOLIC BLOOD PRESSURE: 78 MMHG | TEMPERATURE: 98.4 F | SYSTOLIC BLOOD PRESSURE: 128 MMHG | HEART RATE: 91 BPM

## 2021-07-01 VITALS
DIASTOLIC BLOOD PRESSURE: 79 MMHG | WEIGHT: 244.4 LBS | HEIGHT: 69 IN | TEMPERATURE: 97.5 F | SYSTOLIC BLOOD PRESSURE: 139 MMHG | HEART RATE: 96 BPM | BODY MASS INDEX: 36.2 KG/M2

## 2021-07-01 VITALS
HEIGHT: 69 IN | BODY MASS INDEX: 36.88 KG/M2 | HEART RATE: 83 BPM | DIASTOLIC BLOOD PRESSURE: 82 MMHG | SYSTOLIC BLOOD PRESSURE: 147 MMHG | TEMPERATURE: 98.5 F | WEIGHT: 249 LBS

## 2021-07-01 VITALS
DIASTOLIC BLOOD PRESSURE: 83 MMHG | HEIGHT: 69 IN | TEMPERATURE: 98.4 F | SYSTOLIC BLOOD PRESSURE: 127 MMHG | WEIGHT: 262 LBS | HEART RATE: 112 BPM | BODY MASS INDEX: 38.8 KG/M2

## 2021-07-01 VITALS
SYSTOLIC BLOOD PRESSURE: 139 MMHG | TEMPERATURE: 98.1 F | HEART RATE: 94 BPM | WEIGHT: 265.8 LBS | BODY MASS INDEX: 39.37 KG/M2 | HEIGHT: 69 IN | DIASTOLIC BLOOD PRESSURE: 89 MMHG

## 2021-07-01 VITALS
BODY MASS INDEX: 36.72 KG/M2 | SYSTOLIC BLOOD PRESSURE: 126 MMHG | HEART RATE: 90 BPM | DIASTOLIC BLOOD PRESSURE: 86 MMHG | WEIGHT: 247.9 LBS | HEIGHT: 69 IN | TEMPERATURE: 99.5 F